# Patient Record
Sex: MALE | Race: AMERICAN INDIAN OR ALASKA NATIVE
[De-identification: names, ages, dates, MRNs, and addresses within clinical notes are randomized per-mention and may not be internally consistent; named-entity substitution may affect disease eponyms.]

---

## 2021-11-01 NOTE — XRAY REPORT
XR foot 3+V LT



INDICATION / CLINICAL INFORMATION:

pain, injury.



COMPARISON:

None available.

 

FINDINGS:

BONES/JOINT(S): No acute fracture or subluxation. No significant degenerative changes.



SOFT TISSUES: No significant abnormality.



ADDITIONAL FINDINGS: None.







Signer Name: Sharan Tolliver MD 

Signed: 11/1/2021 9:35 AM

Workstation Name: [id number]

## 2022-07-30 ENCOUNTER — HOSPITAL ENCOUNTER (EMERGENCY)
Dept: HOSPITAL 5 - ED | Age: 32
LOS: 1 days | Discharge: LEFT BEFORE DISCHARGE | End: 2022-07-31
Payer: SELF-PAY

## 2022-07-30 VITALS — DIASTOLIC BLOOD PRESSURE: 61 MMHG | SYSTOLIC BLOOD PRESSURE: 122 MMHG

## 2022-07-30 DIAGNOSIS — Z53.21: ICD-10-CM

## 2022-07-30 DIAGNOSIS — M54.9: Primary | ICD-10-CM
